# Patient Record
(demographics unavailable — no encounter records)

---

## 2024-10-28 NOTE — HISTORY OF PRESENT ILLNESS
[FreeTextEntry1] : EDDI GAMING is an 65 year old female who presents today for a 3-month weight check. Her current weight goal continues to be 240-250. Patient reports an additional 10 pound weight loss. She is now under 300 pounds. She expresses she is frustrated with the slow nature of her weight loss. Patient continues to complain of hip pain and is scheduled to start local steroid injections. She has agreed to get her CAT scan, as it is required for surgery.

## 2024-10-28 NOTE — PHYSICAL EXAM
[NI] : Normal [de-identified] : NAD, Axox3  [de-identified] : nonlabored breathing  [de-identified] : Very low hanging pannus  Extending to the mid thigh  Striae is present  No appreciable abdominal hernias  Lap sites well healed  [de-identified] : LE edema  [de-identified] : normal affect

## 2024-10-28 NOTE — PHYSICAL EXAM
[NI] : Normal [de-identified] : NAD, Axox3  [de-identified] : nonlabored breathing  [de-identified] : Very low hanging pannus  Extending to the mid thigh  Striae is present  No appreciable abdominal hernias  Lap sites well healed  [de-identified] : LE edema  [de-identified] : normal affect

## 2024-10-28 NOTE — END OF VISIT
[FreeTextEntry3] : All medical record entries made by the Scribe were at my, Dr. Lauren Shikowitz-Behr, MD, direction and personally dictated by me on 10/25/2024. I have reviewed the chart and agree that the record accurately reflects my personal performance of the history, physical exam, assessment and plan. I have also personally directed, reviewed, and agreed with the chart.

## 2024-10-28 NOTE — ADDENDUM
[FreeTextEntry1] :  I, Kolton Loredo, documented this note as a scribe on behalf of Dr. Lauren Shikowitz-Behr, MD on 10/25/2024.

## 2024-10-29 NOTE — ASSESSMENT
[FreeTextEntry1] : is scheduled for abdominal surgery in January 2025; wants to try the gel shots both knees and left shoulder, as it wakes her up. Will continue with pool exercises.

## 2024-10-29 NOTE — PHYSICAL EXAM
[Bilateral] : knee bilaterally [Left] : left shoulder [Sitting] : sitting [Moderate] : moderate [5___] : internal rotation 5[unfilled]/5 [] : no sensory deficits [TWNoteComboBox6] : internal rotation L1 [de-identified] : external rotation 50 degrees [de-identified] : severe PVD and swelling both legs [TWNoteComboBox7] : flexion 100 degrees [de-identified] : extension 25 degrees

## 2024-10-29 NOTE — HISTORY OF PRESENT ILLNESS
[Gradual] : gradual [4] : 4 [0] : 0 [Dull/Aching] : dull/aching [Sharp] : sharp [Shooting] : shooting [Intermittent] : intermittent [Household chores] : household chores [Leisure] : leisure [Rest] : rest [Walking] : walking [Retired] : Work status: retired [] : Post Surgical Visit: no [FreeTextEntry1] : [facility] knees [de-identified] : 7/22/24 [de-identified] : Dr. Xavier

## 2024-10-29 NOTE — REASON FOR VISIT
[FreeTextEntry2] : Patient c/o bilateral knee pain, but left hip and left shoulder are more painful. Continues to do home exercises.

## 2024-11-05 NOTE — PHYSICAL EXAM
[Left] : left shoulder [Sitting] : sitting [Moderate] : moderate [5___] : internal rotation 5[unfilled]/5 [Bilateral] : knee bilaterally [] : no sensory deficits [TWNoteComboBox6] : internal rotation L1 [de-identified] : external rotation 50 degrees [de-identified] : severe PVD and swelling both legs [TWNoteComboBox7] : flexion 100 degrees [de-identified] : extension 25 degrees

## 2024-11-05 NOTE — HISTORY OF PRESENT ILLNESS
[Gradual] : gradual [4] : 4 [0] : 0 [Dull/Aching] : dull/aching [Sharp] : sharp [Shooting] : shooting [Intermittent] : intermittent [Household chores] : household chores [Leisure] : leisure [Rest] : rest [Walking] : walking [Retired] : Work status: retired [1] : 1 [] : Post Surgical Visit: no [FreeTextEntry1] : [facility] knees [de-identified] : 7/22/24 [de-identified] : Dr. Xavier [de-identified] : B Knees L Shoulder

## 2024-11-05 NOTE — PROCEDURE
[Bilateral] : bilaterally of the [Knee] : knee [Large Joint Injection] : Large joint injection [Left] : of the left [Glenohumeral Joint] : glenohumeral joint [Pain] : pain [X-ray evidence of Osteoarthritis on this or prior visit] : x-ray evidence of Osteoarthritis on this or prior visit [Betadine] : betadine [Ethyl Chloride sprayed topically] : ethyl chloride sprayed topically [Sterile technique used] : sterile technique used [Gel-Syn (16.8mg)] : 16.8mg of Gel-Syn [#1] : series #1 [] : Patient tolerated procedure well [Apply ice for 15min out of every hour for the next 12-24 hours as tolerated] : apply ice for 15 minutes out of every hour for the next 12-24 hours as tolerated [Patient was advised to rest the joint(s) for ____ days] : patient was advised to rest the joint(s) for [unfilled] days [Risks, benefits, alternatives discussed / Verbal consent obtained] : the risks benefits, and alternatives have been discussed, and verbal consent was obtained [Prior failure or difficult injection] : prior failure or difficult injection [All ultrasound images have been permanently captured and stored accordingly in our picture archiving and communication system] : All ultrasound images have been permanently captured and stored accordingly in our picture archiving and communication system [Visualization of the needle and placement of injection was performed without complication] : visualization of the needle and placement of injection was performed without complication

## 2024-11-12 NOTE — HISTORY OF PRESENT ILLNESS
[Gradual] : gradual [4] : 4 [0] : 0 [Dull/Aching] : dull/aching [Sharp] : sharp [Shooting] : shooting [Intermittent] : intermittent [Household chores] : household chores [Leisure] : leisure [Rest] : rest [Walking] : walking [Retired] : Work status: retired [2] : 2 [] : Post Surgical Visit: no [FreeTextEntry1] : [facility] knees [de-identified] : 7/22/24 [de-identified] : Dr. Xavier [de-identified] : 11/5/2024 [de-identified] : B Knees L Shoulder

## 2024-11-12 NOTE — PROCEDURE
[Bilateral] : bilaterally of the [Knee] : knee [Large Joint Injection] : Large joint injection [Left] : of the left [Glenohumeral Joint] : glenohumeral joint [Pain] : pain [X-ray evidence of Osteoarthritis on this or prior visit] : x-ray evidence of Osteoarthritis on this or prior visit [Betadine] : betadine [Ethyl Chloride sprayed topically] : ethyl chloride sprayed topically [Sterile technique used] : sterile technique used [Gel-Syn (16.8mg)] : 16.8mg of Gel-Syn [] : Patient tolerated procedure well [Apply ice for 15min out of every hour for the next 12-24 hours as tolerated] : apply ice for 15 minutes out of every hour for the next 12-24 hours as tolerated [Patient was advised to rest the joint(s) for ____ days] : patient was advised to rest the joint(s) for [unfilled] days [Risks, benefits, alternatives discussed / Verbal consent obtained] : the risks benefits, and alternatives have been discussed, and verbal consent was obtained [Prior failure or difficult injection] : prior failure or difficult injection [All ultrasound images have been permanently captured and stored accordingly in our picture archiving and communication system] : All ultrasound images have been permanently captured and stored accordingly in our picture archiving and communication system [Visualization of the needle and placement of injection was performed without complication] : visualization of the needle and placement of injection was performed without complication [#2] : series #2

## 2024-11-12 NOTE — PHYSICAL EXAM
[Left] : left shoulder [Sitting] : sitting [Moderate] : moderate [5___] : internal rotation 5[unfilled]/5 [Bilateral] : knee bilaterally [] : no sensory deficits [TWNoteComboBox6] : internal rotation L1 [de-identified] : external rotation 50 degrees [de-identified] : severe PVD and swelling both legs [TWNoteComboBox7] : flexion 100 degrees [de-identified] : extension 25 degrees

## 2024-11-19 NOTE — ASSESSMENT
[FreeTextEntry1] : recommend rest and apply ice to the knees and left shoulder today wants a Rx for PT

## 2024-11-19 NOTE — ASSESSMENT
[FreeTextEntry1] : 65-year-old woman s/p Laparoscopic Gastric Sleeve Gastrectomy with hiatal hernia repair presents for follow-up. 12 lb weight loss since last visit over 2 months ago; on Ozempic 2mg and Topiramate 100mg/PM (by OM). Nutrition and exercise guidelines reviewed with the patient.   Encourage 3 protein-focused meals/day (aim for 70 g protein/day); consider [place] yogurt for breakfast  Avoid skipping meals (i.e. frontload calories earlier in the day) Increase zero calorie fluid intake (aim for 64 oz/day) Continue bariatric vitamins Continue PT Weigh yourself 1x-2x/week Try the Calm dustin or Soothing Pod dustin for stress management Follow-up with nutritionist (keep a food log) Follow-up with OM Follow-up in February, 4-6 weeks after panniculectomy All questions answered   Call with any questions or concerns   Time before and after visit spent reviewing chart

## 2024-11-19 NOTE — PHYSICAL EXAM
[Obese, well nourished, in no acute distress] : obese, well nourished, in no acute distress [Normal] : affect appropriate [de-identified] : Equal chest rise, non-labored respirations, no audible wheezing. [de-identified] : soft, NT, ND, no evidence of hernia; diastasis observed, incisions well-healed; abdominal pannus [de-identified] : difficulty ambulating, uses a walker

## 2024-11-19 NOTE — PHYSICAL EXAM
[Obese, well nourished, in no acute distress] : obese, well nourished, in no acute distress [Normal] : affect appropriate [de-identified] : Equal chest rise, non-labored respirations, no audible wheezing. [de-identified] : soft, NT, ND, no evidence of hernia; diastasis observed, incisions well-healed; abdominal pannus [de-identified] : difficulty ambulating, uses a walker

## 2024-11-19 NOTE — PHYSICAL EXAM
[Left] : left shoulder [Sitting] : sitting [Moderate] : moderate [5___] : internal rotation 5[unfilled]/5 [Bilateral] : knee bilaterally [] : no sensory deficits [TWNoteComboBox6] : internal rotation L1 [de-identified] : external rotation 50 degrees [de-identified] : severe PVD and swelling both legs [TWNoteComboBox7] : flexion 100 degrees [de-identified] : extension 25 degrees

## 2024-11-19 NOTE — ADDENDUM
[FreeTextEntry1] : Note entered by Shahida Plascencia, acting as a scribe.
[FreeTextEntry1] : Note entered by Shahida Plascencia, acting as a scribe.
Yes

## 2024-11-19 NOTE — HISTORY OF PRESENT ILLNESS
[Gradual] : gradual [4] : 4 [0] : 0 [Dull/Aching] : dull/aching [Sharp] : sharp [Shooting] : shooting [Intermittent] : intermittent [Household chores] : household chores [Leisure] : leisure [Rest] : rest [Walking] : walking [Retired] : Work status: retired [3] : 3 [] : Post Surgical Visit: no [FreeTextEntry1] : [facility] knees [de-identified] : 11/12/24 [de-identified] : Dr. Xavier [de-identified] : 11/12/24 [de-identified] : B Knees L Shoulder

## 2024-11-19 NOTE — HISTORY OF PRESENT ILLNESS
[Procedure: ___] : Procedure performed: [unfilled]  [Date of Surgery: ___] : Date of Surgery:   [unfilled] [Surgeon Name:   ___] : Surgeon Name: Dr. AGUILAR [Pre-Op Weight ___] : Pre-op weight was [unfilled] lbs [de-identified] : 65-year-old woman s/p Laparoscopic Gastric Sleeve Gastrectomy with hiatal hernia repair presents for follow-up. 12 lb weight loss since last visit over 2 months ago; on Ozempic 2mg and Topiramate 100mg/PM (by OM). Reports constipation (attributes to opioid pain medication); reports no abdominal pain, nausea/vomiting, diarrhea, reflux/heartburn. Patient eating 2-3 protein-rich meals/day (sometimes skips breakfast), is trying to drink adequate zero-calorie fluids/day, taking bariatric vitamins, and doing aqua-aerobics for exercise. Pt has panniculectomy scheduled for 1/8/25.   Next nutritionist appointment is 11/22/24 Next Obesity Medicine appointment is 12/2/24

## 2024-11-19 NOTE — ASSESSMENT
[FreeTextEntry1] : 65-year-old woman s/p Laparoscopic Gastric Sleeve Gastrectomy with hiatal hernia repair presents for follow-up. 12 lb weight loss since last visit over 2 months ago; on Ozempic 2mg and Topiramate 100mg/PM (by OM). Nutrition and exercise guidelines reviewed with the patient.   Encourage 3 protein-focused meals/day (aim for 70 g protein/day); consider Thai yogurt for breakfast  Avoid skipping meals (i.e. frontload calories earlier in the day) Increase zero calorie fluid intake (aim for 64 oz/day) Continue bariatric vitamins Continue PT Weigh yourself 1x-2x/week Try the Calm dustin or Soothing Pod dustin for stress management Follow-up with nutritionist (keep a food log) Follow-up with OM Follow-up in February, 4-6 weeks after panniculectomy All questions answered   Call with any questions or concerns   Time before and after visit spent reviewing chart

## 2024-11-19 NOTE — HISTORY OF PRESENT ILLNESS
[Procedure: ___] : Procedure performed: [unfilled]  [Date of Surgery: ___] : Date of Surgery:   [unfilled] [Surgeon Name:   ___] : Surgeon Name: Dr. AGUILAR [Pre-Op Weight ___] : Pre-op weight was [unfilled] lbs [de-identified] : 65-year-old woman s/p Laparoscopic Gastric Sleeve Gastrectomy with hiatal hernia repair presents for follow-up. 12 lb weight loss since last visit over 2 months ago; on Ozempic 2mg and Topiramate 100mg/PM (by OM). Reports constipation (attributes to opioid pain medication); reports no abdominal pain, nausea/vomiting, diarrhea, reflux/heartburn. Patient eating 2-3 protein-rich meals/day (sometimes skips breakfast), is trying to drink adequate zero-calorie fluids/day, taking bariatric vitamins, and doing aqua-aerobics for exercise. Pt has panniculectomy scheduled for 1/8/25.   Next nutritionist appointment is 11/22/24 Next Obesity Medicine appointment is 12/2/24

## 2024-11-19 NOTE — PROCEDURE
[Bilateral] : bilaterally of the [Knee] : knee [Large Joint Injection] : Large joint injection [Left] : of the left [Glenohumeral Joint] : glenohumeral joint [Pain] : pain [X-ray evidence of Osteoarthritis on this or prior visit] : x-ray evidence of Osteoarthritis on this or prior visit [Betadine] : betadine [Ethyl Chloride sprayed topically] : ethyl chloride sprayed topically [Sterile technique used] : sterile technique used [Gel-Syn (16.8mg)] : 16.8mg of Gel-Syn [] : Patient tolerated procedure well [Apply ice for 15min out of every hour for the next 12-24 hours as tolerated] : apply ice for 15 minutes out of every hour for the next 12-24 hours as tolerated [Patient was advised to rest the joint(s) for ____ days] : patient was advised to rest the joint(s) for [unfilled] days [Risks, benefits, alternatives discussed / Verbal consent obtained] : the risks benefits, and alternatives have been discussed, and verbal consent was obtained [Prior failure or difficult injection] : prior failure or difficult injection [All ultrasound images have been permanently captured and stored accordingly in our picture archiving and communication system] : All ultrasound images have been permanently captured and stored accordingly in our picture archiving and communication system [Visualization of the needle and placement of injection was performed without complication] : visualization of the needle and placement of injection was performed without complication [#3] : series #3

## 2024-12-05 NOTE — ASSESSMENT
[FreeTextEntry1] : BARIATRIC SURGERY HISTORY: Laparoscopic Gastric Sleeve Gastrectomy with hiatal hernia repair , Date of Surgery: 9/26/2022  OBESITY CO-MORBIDITIES: DM, MANDY  DM resolved- now pre-dm  ANTI-OBESITY MEDICATIONS: semaglutide, topiramate   OBESITY MEDICATION SIDE EFFECTS: none    Plan:   f/u with bariatric team prepare food in advance, lean animal protein and fiber 3 meals a day last dose of ozempic on jan 13 for surgery  will decrease topiramate to 50mg and advised to be off topiramate 1 week prior to surgery. can restart topiramate at 50mg when patient is recovered from anesthesia  continue physical activity as tolerated- aquatherapy  cpap nightly  f/u 6-8 weeks

## 2024-12-05 NOTE — HISTORY OF PRESENT ILLNESS
[FreeTextEntry1] : This is a 64 year old female  being seen obesity followup visit.   Weight is stable  continues ozempic 2mg and topiramate 100mg, tolerating well. Denies side effects   patient prioritizing adequate protein intake and water  drinks protein shake daily  working with RD   in pool 3x/week for 2 hours- will do PT exercises at home   Patient had CT of abdomen to rule out hernia- noted gallstones- asymptomatic   appt scheduled for january 8 for panniculectomy- in hopes to get clearance for hip surgery- ortho needs better access to hip   Patient  requires hip surgery, goal BMI 40-45    was just diagnosed with non-hogkins lymphoma- who also has bladder cancer

## 2024-12-13 NOTE — REVIEW OF SYSTEMS
Ibuprofen (advil) 400-600 mg every 6 hours as needed for pain.  Follow up with your doctors at [facility].  Return for new or worsening symptoms, leg swelling, chest pain, shortness of breath, or palpitations.   [As Noted in HPI] : as noted in HPI [Negative] : Heme/Lymph

## 2024-12-16 NOTE — PHYSICAL EXAM
[NI] : Normal [de-identified] : NAD, Axox3  [de-identified] : nonlabored breathing  [de-identified] : Very low hanging pannus  Extending to the mid thigh  Striae is present  No appreciable abdominal hernias  Lap sites well healed  [de-identified] : LE edema  [de-identified] : normal affect

## 2024-12-16 NOTE — PHYSICAL EXAM
[NI] : Normal [de-identified] : NAD, Axox3  [de-identified] : nonlabored breathing  [de-identified] : Very low hanging pannus  Extending to the mid thigh  Striae is present  No appreciable abdominal hernias  Lap sites well healed  [de-identified] : LE edema  [de-identified] : normal affect

## 2024-12-16 NOTE — HISTORY OF PRESENT ILLNESS
[FreeTextEntry1] : EDDI GAMING is an 65 year old female who presents today for pre-operative discussion. She is scheduled for a Panniculectomy/Umbilectomy on 1/8/25. Patient with no new complaints today.  CT abdomen/pelvis revealed no hernias

## 2024-12-16 NOTE — END OF VISIT
[FreeTextEntry3] : All medical record entries made by the Scribe were at my, Dr. Lauren Shikowitz-Behr, MD, direction and personally dictated by me on 12/13/2024. I have reviewed the chart and agree that the record accurately reflects my personal performance of the history, physical exam, assessment and plan. I have also personally directed, reviewed, and agreed with the chart.

## 2024-12-16 NOTE — ADDENDUM
[FreeTextEntry1] :  I, Kolton Loredo, documented this note as a scribe on behalf of Dr. Lauren Shikowitz-Behr, MD on 12/13/2024.

## 2024-12-23 NOTE — DISCUSSION/SUMMARY
[EKG obtained to assist in diagnosis and management of assessed problem(s)] : EKG obtained to assist in diagnosis and management of assessed problem(s) [FreeTextEntry1] : Ms. Oconnor had a chest CT scan performed on 11/24/18 which was interpreted as revealing mild calcific coronary atherosclerosis as well as a dilated main pulmonary artery, suggestive of pulmonary hypertension. She reports having been diagnosed as having bilateral lower extremity lymphedema, for which she had previously been treated at a lymphedema clinic, and has been managed with compression stockings. She has been discovered as having severe obstructive sleep apnea, for which she is being treated with CPAP therapy.   The patient has been stable from a cardiac symptomatic standpoint since her previous visit with me on 8/19/2024.  Her cardiac examination today is unchanged from her previous visit with me, again remarkable for diminished heart tones, in all likelihood related to her obesity.  She has generalized diminished breath sounds on lung examination without rales, wheezing, or a prolonged expiratory phase. She has marked non-pitting bilateral lower extremity edema, unchanged from her previous visit with me.  Her blood pressure reading today is normal.  Her electrocardiogram today reveals sinus rhythm with mild first-degree AV block, nonspecific generalized diminished voltage, and nonspecific poor R wave progression, essentially unchanged from her previous office tracing, allowing for lead placement variation.  As her blood pressure reading today is normal, I have instructed the patient to continue lisinopril 20 mg daily for the time being.  I have reviewed the findings of the pharmacological nuclear stress study of 6/1/2022, the echocardiogram of 9/5/2024, the carotid artery Doppler study of 8/11/2023, and the 1 week extended Holter monitor study of 8/25/2023 in detail with the patient today.  I am referring the patient for follow-up echocardiography at this point to assess cardiac structural integrity, left ventricular function, valvular morphology and function, and the pulmonary artery systolic pressure.  The patient is going to make arrangements to have this study performed through our office, and I will telephone her to discuss the findings, once the study has been completed.  I have reviewed the findings of the blood test report of 11/12/2024 in detail with the patient today, and I have instructed her to continue Pravachol 40 mg daily for the time being.  The importance of dietary modification and weight loss was again emphasized to the patient today.  The patient is going to be following up with her pulmonologist regarding management of obstructive sleep apnea.  I find no absolute cardiac contraindication to the patient undergoing the proposed panniculectomy procedure as scheduled, under routine monitoring conditions.  I have asked the patient to call me if she should have any questions or problems pertaining to these matters, and especially if she should experience any concerning symptoms.  I have otherwise asked her to return to the office for follow-up cardiac evaluation in 6 months, provided she remains clinically stable in the interim.  Follow-up echocardiography will be planned for September 2025 for reassessment of the diameter of the ascending aorta.

## 2024-12-23 NOTE — CARDIOLOGY SUMMARY
[de-identified] : 12/23/24 -sinus rhythm at a rate of 86 bpm.  Nonspecific generalized diminished voltage.  First-degree AV block.  Nonspecific poor R wave progression.

## 2024-12-23 NOTE — CARDIOLOGY SUMMARY
[de-identified] : 12/23/24 -sinus rhythm at a rate of 86 bpm.  Nonspecific generalized diminished voltage.  First-degree AV block.  Nonspecific poor R wave progression.

## 2024-12-23 NOTE — HISTORY OF PRESENT ILLNESS
[FreeTextEntry1] : Ms. Nu Oconnor presented to the office today for follow-up cardiac evaluation.  I evaluated the patient in initial cardiology consultation on 18, noting that she was advised by her pulmonologist, Dr. Sams, to consult with a cardiologist regarding pulmonary hypertension and coronary artery calcifications noted on a CT scan of the chest.  I last evaluated the patient in the office on 2024.  The patient is a 65-year-old female with a history of hypertension, a dyslipidemia, diabetes, coronary artery calcifications, pulmonary hypertension, obstructive sleep apnea (CPAP), chronic lower extremity edema, breast carcinoma (status post right mastectomy ), colon polyps (3 tubular adenomas resected on 18), anxiety/depression, a suspected left adrenal nodule (reported on a chest CT scan of 18), osteoarthritis (knees and hips bilaterally), and a laparoscopic sleeve gastrectomy procedure with simultaneous hiatal hernia repair (2022).  The patient has not noted dyspnea on exertion since her previous visit here on 2024, noting that she ambulates with a walker and exercises in a swimming pool 3 times weekly. She sleeps in a recliner chair with a CPAP device, without experiencing orthopnea or paroxysmal nocturnal dyspnea. She has not noted any appreciable change in her usual degree of lower extremity swelling, which has been attributed to bilateral lymphedema, for which she wears compression stockings, and had previously been following in a lymphedema clinic.  She has not experienced any symptoms of chest discomfort which have been precipitated or exacerbated by physical activity.  She has not experienced any episodes of palpitations, presyncope or syncope.  The patient reports having been evaluated by a vascular specialist (Dr. Pinto), who attributed her lower extremity swelling to lymphedema and referred her to a lymphedema specialist.  The patient consulted with a lymphedema specialist, who the patient states did not feel that she had "true lymphedema", noting that she appears to respond to wearing compression stockings.  The patient anticipates undergoing panniculectomy on 2025, in hopes of allowing her orthopedist better access for potential hip surgery in the future.  She is currently being managed with Ozempic and topiramate in an effort to lose weight (she is status post laparoscopic sleeve gastrectomy 2022).  The Ozempic is presently on hold in anticipation of the upcoming surgery.  The patient telephoned me on 2023 after having been told of exhibiting "an irregular heartbeat" during a transcranial Doppler study performed on 2023 for further evaluation of a left-sided visual disturbance (ocular migraine?).  The transcranial Doppler study was interpreted as being unrevealing.  A follow-up 1 week extended Holter monitor study initiated on 2023 revealed the predominant rhythm to be sinus rhythm at an average rate of 67 bpm with rare supraventricular premature contractions, rare ventricular premature contractions, and an isolated 6 beat episode of wide-complex tachycardia at an average rate of 136 bpm.  As far as risk factors for coronary artery disease are concerned, the patient has a history of hypertension and a dyslipidemia. She describes having exhibited evidence for pre-diabetes. She discontinued cigarette smoking in , having previously smoked one pack per day for a period of 10 years. She does not have a known immediate family history of premature coronary artery disease.  Laboratory studies performed on 2024 (on Pravachol 40 mg daily) revealed cholesterol 164, triglycerides 112, HDL 45, and calculated LDL 99.  The liver chemistries were normal.  The BUN and creatinine were 28 and 1.54, respectively.  The potassium level was 4.6.  The glucose level was 92 and the hemoglobin A1c level was 5.1%.  The hemoglobin and hematocrit were 11.7 and 35.9, respectively.  The vitamin D level was 35.9.  Carotid artery Doppler testing performed on 2023 was interpreted as revealing scattered plaque without any stenoses demonstrated.  Echocardiography most recently performed on 2024 revealed normal cardiac chamber sizes with normal left ventricular wall thickness.  Left ventricular systolic function was normal, with a calculated ejection fraction of 62%.  Impaired diastolic relaxation of the left ventricle was demonstrated.  The ascending aorta was noted to be mildly dilated, measuring 4.0 cm in diameter.  No significant valvular abnormalities were demonstrated.  There was no evidence of pulmonary hypertension.  Pharmacological nuclear stress testing performed on 2022 was negative for the inducement of cardiac symptoms, electrocardiographic evidence of myocardial ischemia, or significant arrhythmias.  The cardiac imaging portion of the study revealed normal left ventricular myocardial perfusion and systolic function, with an estimated ejection fraction of 65%.  Previous History:  The patient had noted dyspnea on exertion over a period of several months, for which she sought attention at an urgent care facility on 11/15/18. She was diagnosed as having bronchitis, for which prednisone, doxycycline, and albuterol nebulizer therapy were prescribed, with gradual improvement in her symptoms. She then consulted with Dr. Sams on 18, who recommended continuing this therapy. The patient was referred for a CT scan of the chest, which was performed on 18, and was interpreted as revealing a 1.5 cm groundglass opacity involving the right middle lobe of the lung, a dilated main pulmonary artery, mild calcific coronary atherosclerosis, and a suspected 1.6 cm left adrenal nodule. The patient followed up with Dr. Sams on 18, who recommended cardiology consultation with echocardiography for further evaluation of the dilated main pulmonary artery described on the chest CT scan, a home sleep study to rule out obstructive sleep apnea, and an abdominal MRI scan for further evaluation of the left adrenal nodule described on the chest CT scan (she reports having presented for the MRI scan, however, the study could not be performed, secondary to the patient having a significant cough and being unable to lie still on the table).  Past medical/surgical history according to the patient is significant for the patient having had arthroscopic repair of a torn meniscus involving the left knee in . She underwent a MOHS procedure several years ago for treatment of a left paranasal squamous cell carcinoma. She has had 3 full-term uncomplicated pregnancies, the first of which was delivered via  section secondary to breech presentation, with the subsequent 2 pregnancies being delivered vaginally. She reports suffering from anxiety/depression, for which she is presently being treated with Prozac, as well as Xanax p.r.n.

## 2024-12-23 NOTE — PHYSICAL EXAM
[General Appearance - In No Acute Distress] : no acute distress [Normal Conjunctiva] : the conjunctiva exhibited no abnormalities [No Oral Pallor] : no oral pallor [] : no respiratory distress [Respiration, Rhythm And Depth] : normal respiratory rhythm and effort [Bowel Sounds] : normal bowel sounds [Abdomen Tenderness] : non-tender [Abnormal Walk] : normal gait [Oriented To Time, Place, And Person] : oriented to person, place, and time [Not Palpable] : not palpable [No Precordial Heave] : no precordial heave was noted [Normal Rate] : normal [Rhythm Regular] : regular [Normal S1] : normal S1 [Normal S2] : normal S2 [No Gallop] : no gallop heard [Distant] : the heart sounds were distant [No Murmur] : no murmurs heard [2+] : left 2+ [No Abnormalities] : the abdominal aorta was not enlarged and no bruit was heard [Nonpitting Edema] : nonpitting edema present [FreeTextEntry1] : chronic lower extremity venous stasis changes with chronic cellulitic changes [Apical Thrill] : no thrill palpable at the apex [Click] : no click [Pericardial Rub] : no pericardial rub [Right Carotid Bruit] : no bruit heard over the right carotid [Left Carotid Bruit] : no bruit heard over the left carotid

## 2025-02-07 NOTE — ADDENDUM
[FreeTextEntry1] :  I, Kolton Loredo, documented this note as a scribe on behalf of Dr. Lauren Shikowitz-Behr, MD on 02/04/2025.

## 2025-02-07 NOTE — PHYSICAL EXAM
[de-identified] : NAD, AxOx3  [de-identified] : nonlabored breathing  [de-identified] : lower abdominal incision clean, dry, and intact  expected swelling is present  flaps appear viable  prevena dressing in  place  drains light serosanguinous in output  no evidence of hematoma, infection, or skin breakdown [de-identified] : +edema  [de-identified] : as above [de-identified] : normal affect

## 2025-02-07 NOTE — HISTORY OF PRESENT ILLNESS
[FreeTextEntry1] : EDDI GAMING is an 65 year old female who presents today for first postoperative visit s/p Panniculectomy/Umbilectomy on 1/30/25. Patient reports of mild discomfort but denies significant pain. As per the patient and , drains do not meet criteria for removal.  She denies fevers, chills, LE edema, SOB or CP

## 2025-02-07 NOTE — PHYSICAL EXAM
[de-identified] : NAD, AxOx3  [de-identified] : nonlabored breathing  [de-identified] : lower abdominal incision clean, dry, and intact  expected swelling is present  flaps appear viable  prevena dressing in  place  drains light serosanguinous in output  no evidence of hematoma, infection, or skin breakdown [de-identified] : +edema  [de-identified] : as above [de-identified] : normal affect

## 2025-02-07 NOTE — END OF VISIT
[FreeTextEntry3] : All medical record entries made by the Scribe were at my, Dr. Lauren Shikowitz-Behr, MD, direction and personally dictated by me on 02/04/2025. I have reviewed the chart and agree that the record accurately reflects my personal performance of the history, physical exam, assessment and plan. I have also personally directed, reviewed, and agreed with the chart.

## 2025-02-07 NOTE — PHYSICAL EXAM
[de-identified] : NAD, AxOx3  [de-identified] : nonlabored breathing  [de-identified] : lower abdominal incision clean, dry, and intact  expected swelling is present  flaps appear viable  prevena dressing in  place  drains light serosanguinous in output  no evidence of hematoma, infection, or skin breakdown [de-identified] : +edema  [de-identified] : as above [de-identified] : normal affect

## 2025-02-10 NOTE — HISTORY OF PRESENT ILLNESS
[FreeTextEntry1] : 65 year old female who presents today for first postoperative visit s/p Panniculectomy/Umbilectomy on 1/30/25. Patient reports left abdominal drain slipped out last night, right SHELLEY drain with output over 100cc's in 24 hours. She denies fever, chills, redness, discharge.

## 2025-02-10 NOTE — PHYSICAL EXAM
[de-identified] : abdomen soft, nt. incisions c/d/i and healing well. no erythema/warmth/dc. right abdominal SHELLEY drain held to suction with SS output. dressing changed. left SHELLEY site suture removed, dressing placed.

## 2025-02-19 NOTE — HISTORY OF PRESENT ILLNESS
[FreeTextEntry1] : This is a 64 year old female with DM, MANDY with hx of Laparoscopic Gastric Sleeve Gastrectomy seen for obesity followup visit.   current weight: 280 overall weight loss: 130 lbs  underwent panniculectomy 3 weeks ago needs hip replacement surgery- ortho needs better access to hip   Post op- resumed ozempic 1mg and topiramate 75 mg tolerating well without side effects  will increase slowly back to doses prior to surgery  was previously on ozempic 2mg and topiramate 100 mg prior to surgery   still has drain  following up with plastic surgery weekly and visiting nurse   drinking 1 protein shake a day staying hydrated   Patient had CT of abdomen in Dec to rule out hernia- noted gallstones- asymptomatic - Advised by GI to continue ozempic - will see him in the future to discuss removing gallbladder

## 2025-02-19 NOTE — ASSESSMENT
[FreeTextEntry1] : BARIATRIC SURGERY HISTORY: Laparoscopic Gastric Sleeve Gastrectomy with hiatal hernia repair , Date of Surgery: 9/26/2022  OBESITY CO-MORBIDITIES: DM, MANDY  DM resolved- now pre-dm  ANTI-OBESITY MEDICATIONS: semaglutide, topiramate   OBESITY MEDICATION SIDE EFFECTS: none    Plan: f/u with surgeon weekly  f/u with bariatrics 3/4 f/u with GI focus on whole food, lean animal protein and fiber stay hydrated  3 distinguished meals a day continue with ozempic 1mg for 1 more week and then increase to ozempic 2mg  continue topiramate 75mg (has a few days left) and will increase back to topiramate 100mg  resume physical activity as tolerated continue cpap nightly discussed possible risks associated with glp1s and gallstones- pt verbalized understanding   f/u 6-8 weeks

## 2025-02-19 NOTE — HISTORY OF PRESENT ILLNESS
[FreeTextEntry1] : EDDI GAMING is an 65 year old female who presents today s/p Panniculectomy/Umbilectomy on 1/30/25. Patient's visiting nurse called the office today stating Poli drain is not holding suction. Visiting nurse reinforced drain, but patient was instructed to come to the office today for further evaluation of drain Patient states she is taking a diuretic for her LE edema and continues with oral abx as prescribed by our office As per patient, drain output still 70-80cc's over 24hrs.

## 2025-02-19 NOTE — PHYSICAL EXAM
[de-identified] : NAD, AxOx3  [de-identified] : nonolabored breathing  [de-identified] : lower abdominal incision clean with 0.5 m area central superficial wound separation, slightly larger mild fibrinous tissue present Right abdominal drain with approximately 3cm of tubing dislodged Surrounding macreration at drain insertion site No evidence of infection Drain is serous in output  [de-identified] : +LE edema  [de-identified] : as above [de-identified] : normal affect

## 2025-02-19 NOTE — END OF VISIT
[FreeTextEntry3] :  All medical record entries made by the Scribe were at my, PILAR Segovia, direction and personally dictated by me on  02/18/2025. I have reviewed the chart and agree that the record accurately reflects my personal performance of the history, physical exam, assessment and plan. I have also personally directed, reviewed, and agreed with the chart.

## 2025-02-19 NOTE — END OF VISIT
[FreeTextEntry3] : All medical record entries made by the Scribe were at my, Dr. Lauren Shikowitz-Behr, MD, direction and personally dictated by me on 02/14/2025. I have reviewed the chart and agree that the record accurately reflects my personal performance of the history, physical exam, assessment and plan. I have also personally directed, reviewed, and agreed with the chart.

## 2025-02-19 NOTE — PHYSICAL EXAM
[de-identified] : NAD, AxOx 3 [de-identified] : nonlabored breathing  [de-identified] : Lower abdominal incision clean and dry  There is central superficial wound separation  No evidence of infection  Drain is serous in output [de-identified] : +LE edema  [de-identified] : as above [de-identified] : normal affect

## 2025-02-19 NOTE — PHYSICAL EXAM
[de-identified] : NAD, AxOx 3 [de-identified] : nonlabored breathing  [de-identified] : Lower abdominal incision clean and dry  There is central superficial wound separation  No evidence of infection  Drain is serous in output [de-identified] : +LE edema  [de-identified] : as above [de-identified] : normal affect

## 2025-02-19 NOTE — ADDENDUM
[FreeTextEntry1] :  I, Kolton Loredo, documented this note as a scribe on behalf of PILAR Segovia on  02/18/2025.

## 2025-02-19 NOTE — PHYSICAL EXAM
[de-identified] : NAD, AxOx3  [de-identified] : nonolabored breathing  [de-identified] : lower abdominal incision clean with 0.5 m area central superficial wound separation, slightly larger mild fibrinous tissue present Right abdominal drain with approximately 3cm of tubing dislodged Surrounding macreration at drain insertion site No evidence of infection Drain is serous in output  [de-identified] : +LE edema  [de-identified] : as above [de-identified] : normal affect

## 2025-02-19 NOTE — HISTORY OF PRESENT ILLNESS
[FreeTextEntry1] : EDDI GAMING is an 65 year old female who presents today s/p Panniculectomy/Umbilectomy on 1/30/25. Patient admits that the left drain had fallen out several days ago. Patient does state that the right abdominal drain ranges from 70-80ccs over 24 hours. She otherwise has no other complaints.

## 2025-02-19 NOTE — ADDENDUM
[FreeTextEntry1] :  I, Kolton Loredo, documented this note as a scribe on behalf of Dr. Lauren Shikowitz-Behr, MD on 02/14/2025.

## 2025-03-03 NOTE — HISTORY OF PRESENT ILLNESS
[FreeTextEntry1] : Sonam Oconnor is a 64 y/o female s/p Panniculectomy./Umbilectomy on 1/30/25. Patient had drain re-sutured in place, but states she felt it get caught on the arm of her chair. She now states drain is not holding suction. She otherwise has no other complaints

## 2025-03-03 NOTE — PHYSICAL EXAM
[de-identified] : NAD, AxOX3  [de-identified] : nonlabored breathing  [de-identified] : incision, clean and dry central incision with superficial wound separation, healing, neva and granulating DRain serous in output Drain black dot approx 2-3 cm dislodged No evidence of infection [de-identified] : +edema  [de-identified] : as above [de-identified] : normal affect

## 2025-03-03 NOTE — PHYSICAL EXAM
[de-identified] : NAD, AxOX3  [de-identified] : nonlabored breathing  [de-identified] : incision, clean and dry central incision with superficial wound separation, healing, neva and granulating DRain serous in output Drain black dot approx 2-3 cm dislodged No evidence of infection [de-identified] : +edema  [de-identified] : as above [de-identified] : normal affect

## 2025-03-04 NOTE — PHYSICAL EXAM
[de-identified] : NAD, AxOx3  [de-identified] : nonlabored breathing  [de-identified] : Lower abdominal incision clean  With stable central superficial wound separation  Granulation tissue is present  Right abdominal drain remains in place with serous output  [de-identified] : no edema  [de-identified] : as above [de-identified] : normal affect

## 2025-03-04 NOTE — PHYSICAL EXAM
[de-identified] : NAD, AxOx3  [de-identified] : nonlabored breathing  [de-identified] : Lower abdominal incision clean  With stable central superficial wound separation  Granulation tissue is present  Right abdominal drain remains in place with serous output  [de-identified] : no edema  [de-identified] : as above [de-identified] : normal affect

## 2025-03-04 NOTE — END OF VISIT
[FreeTextEntry3] : All medical record entries made by the Scribe were at my, Dr. Lauren Shikowitz-Behr, MD, direction and personally dictated by me on 02/28/2025. I have reviewed the chart and agree that the record accurately reflects my personal performance of the history, physical exam, assessment and plan. I have also personally directed, reviewed, and agreed with the chart. None

## 2025-03-04 NOTE — ADDENDUM
[FreeTextEntry1] :  I, Kolton Loredo, documented this note as a scribe on behalf of Dr. Lauren Shikowitz-Behr, MD on 02/28/2025.

## 2025-03-04 NOTE — HISTORY OF PRESENT ILLNESS
[FreeTextEntry1] : EDDI GAMING is an 65 year old female who presents today s/p Panniculectomy/Umbilectomy on 1/30/25. Patient states drain still continues with output of 50ccs over 24 hours but is decreasing. Patient continues with dry dressing to central abdominal incision. She has no other complaints today
[FreeTextEntry1] : EDDI GAMING is an 65 year old female who presents today s/p Panniculectomy/Umbilectomy on 1/30/25. Patient states drain still continues with output of 50ccs over 24 hours but is decreasing. Patient continues with dry dressing to central abdominal incision. She has no other complaints today
Follow up with PMD

## 2025-03-10 NOTE — PHYSICAL EXAM
[de-identified] : NAD, AxOx3  [de-identified] : nonlabored breathing [de-identified] : Lower abdominal incision clean and dry  Central wound separation is closing and becoming more superficial  Granulating well No evidence of infection  Drain remains in place with serous output [de-identified] : no edema [de-identified] : as above  [de-identified] : normal affect

## 2025-03-10 NOTE — HISTORY OF PRESENT ILLNESS
[FreeTextEntry1] : EDDI GAMING is an 65 year old female who presents today s/p Panniculectomy/Umbilectomy on 1/30/25. As per patient and , drain still does not meet criteria for removal. Drain output has been in the 40s. She otherwise has no other complaints today

## 2025-03-10 NOTE — END OF VISIT
[FreeTextEntry3] : All medical record entries made by the Scribe were at my, Dr. Lauren Shikowitz-Behr, MD, direction and personally dictated by me on 03/07/2025. I have reviewed the chart and agree that the record accurately reflects my personal performance of the history, physical exam, assessment and plan. I have also personally directed, reviewed, and agreed with the chart.

## 2025-03-10 NOTE — PHYSICAL EXAM
[de-identified] : NAD, AxOx3  [de-identified] : nonlabored breathing [de-identified] : Lower abdominal incision clean and dry  Central wound separation is closing and becoming more superficial  Granulating well No evidence of infection  Drain remains in place with serous output [de-identified] : no edema [de-identified] : as above  [de-identified] : normal affect

## 2025-03-10 NOTE — ADDENDUM
[FreeTextEntry1] :  I, Kolton Loredo, documented this note as a scribe on behalf of Dr. Lauren Shikowitz-Behr, MD on 03/07/2025.

## 2025-03-14 NOTE — PHYSICAL EXAM
[de-identified] : NAD, AxOx3  [de-identified] : nonlabored breathing  [de-identified] : Lower abdominal incision healing well  Central wound separation closing and neva  Drain site with mild maceration but clean, without infection [de-identified] : no edema  [de-identified] : as above [de-identified] : normal affect

## 2025-03-14 NOTE — ADDENDUM
[FreeTextEntry1] :  I, Kolton Loredo, documented this note as a scribe on behalf of Dr. Lauren Shikowitz-Behr, MD on 03/14/2025.

## 2025-03-14 NOTE — HISTORY OF PRESENT ILLNESS
[FreeTextEntry1] : EDDI GAMING is an 65 year old female who presents today s/p Panniculectomy/Umbilectomy on 1/30/25. Patient states that drain fell out last night, she does admit to output being 35ccs, 35ccs the 27ccs each over a 24 hour period. She otherwise has no other complaints.

## 2025-03-14 NOTE — END OF VISIT
[FreeTextEntry3] : All medical record entries made by the Scribe were at my, Dr. Lauren Shikowitz-Behr, MD, direction and personally dictated by me on 03/14/2025. I have reviewed the chart and agree that the record accurately reflects my personal performance of the history, physical exam, assessment and plan. I have also personally directed, reviewed, and agreed with the chart.

## 2025-03-25 NOTE — PHYSICAL EXAM
[Left] : left hip [Bilateral] : knee bilaterally [] : no pain with log-rolling of hip [TWNoteComboBox7] : flexion 100 degrees [de-identified] : severe PVD and swelling both legs [de-identified] : extension 25 degrees

## 2025-03-25 NOTE — HISTORY OF PRESENT ILLNESS
[Gradual] : gradual [4] : 4 [0] : 0 [Dull/Aching] : dull/aching [Sharp] : sharp [Shooting] : shooting [Intermittent] : intermittent [Household chores] : household chores [Leisure] : leisure [Rest] : rest [Walking] : walking [Retired] : Work status: retired [] : Post Surgical Visit: no [FreeTextEntry1] : [facility] knees [de-identified] : 11/19/2024 [de-identified] : Dr. Xavier [de-identified] : 11/19/2024

## 2025-03-25 NOTE — REASON FOR VISIT
[FreeTextEntry2] : Patient complains of continued B Knee pain. She would like cortisone injections today

## 2025-03-25 NOTE — ASSESSMENT
[FreeTextEntry1] : cortisone injections both knees; possible risks discussed including infection. Instructed on ice today. Requested prescriptions for PT and wheelchair.

## 2025-04-01 NOTE — PROCEDURE
[Large Joint Injection] : Large joint injection [Left] : of the left [Subacromial Space] : subacromial space [Pain] : pain [X-ray evidence of Osteoarthritis on this or prior visit] : x-ray evidence of Osteoarthritis on this or prior visit [Betadine] : betadine [Ethyl Chloride sprayed topically] : ethyl chloride sprayed topically [Sterile technique used] : sterile technique used [___ cc    1%] : Lidocaine ~Vcc of 1%  [___ cc    40mg] : Methylprednisolone (Depomedrol) ~Vcc of 40 mg  [] : Patient tolerated procedure well [Apply ice for 15min out of every hour for the next 12-24 hours as tolerated] : apply ice for 15 minutes out of every hour for the next 12-24 hours as tolerated [Patient was advised to rest the joint(s) for ____ days] : patient was advised to rest the joint(s) for [unfilled] days [Risks, benefits, alternatives discussed / Verbal consent obtained] : the risks benefits, and alternatives have been discussed, and verbal consent was obtained

## 2025-04-01 NOTE — ASSESSMENT
[FreeTextEntry1] : requested cortisone injection left shoulder today, possible risks discussed including infection; wants to proceed. Instructed on ice today.

## 2025-04-01 NOTE — REASON FOR VISIT
[Spouse] : spouse [FreeTextEntry2] : Patient has increased pain left shoulder since her last visit. She previously had Gelsyn injections with improvement. She would like a cortisone injection today.

## 2025-04-01 NOTE — HISTORY OF PRESENT ILLNESS
[8] : 8 [Dull/Aching] : dull/aching [Household chores] : household chores [Leisure] : leisure [Sleep] : sleep [Meds] : meds [Ice] : ice [Heat] : heat [Stairs] : stairs [] : Post Surgical Visit: no [FreeTextEntry1] : left shoulder  [FreeTextEntry9] : Tylenol  [de-identified] : lifting arm  [de-identified] : 11/19/24 [de-identified] : Dr. Xavier [de-identified] : 11/19/24 [de-identified] : left shoulder

## 2025-04-01 NOTE — PHYSICAL EXAM
[Sitting] : sitting [Moderate] : moderate [5___] : internal rotation 5[unfilled]/5 [Left] : left shoulder [There are no fractures, subluxations or dislocations. No significant abnormalities are seen] : There are no fractures, subluxations or dislocations. No significant abnormalities are seen [Glenohumeral arthritis] : Glenohumeral arthritis [FreeTextEntry1] : severe GH arthritis with subchondral cysts HH and inferior spur [] : no sensory deficits [TWNoteComboBox7] : active forward flexion 160 degrees [de-identified] : active abduction 160 degrees [TWNoteComboBox6] : internal rotation L1 [de-identified] : external rotation 50 degrees

## 2025-04-07 NOTE — PHYSICAL EXAM
[de-identified] : NAD, AxOx3  [de-identified] : nonlabored breathing  [de-identified] : Lower abdominal incision clean and dry  Previous central wound separation is now almost closed  No evidence of infection  [de-identified] : edema [de-identified] : as above [de-identified] : normal affect

## 2025-04-07 NOTE — END OF VISIT
[FreeTextEntry3] : All medical record entries made by the Scribe were at my, Dr. Lauren Shikowitz-Behr, MD, direction and personally dictated by me on 04/04/2025. I have reviewed the chart and agree that the record accurately reflects my personal performance of the history, physical exam, assessment and plan. I have also personally directed, reviewed, and agreed with the chart.

## 2025-04-07 NOTE — ADDENDUM
[FreeTextEntry1] :  I, Kolton Loredo, documented this note as a scribe on behalf of Dr. Lauren Shikowitz-Behr, MD on 04/04/2025.

## 2025-04-07 NOTE — PHYSICAL EXAM
[de-identified] : NAD, AxOx3  [de-identified] : nonlabored breathing  [de-identified] : Lower abdominal incision clean and dry  Previous central wound separation is now almost closed  No evidence of infection  [de-identified] : edema [de-identified] : as above [de-identified] : normal affect

## 2025-04-07 NOTE — HISTORY OF PRESENT ILLNESS
[FreeTextEntry1] : EDDI GAMING is an 65 year old female who presents today s/p Panniculectomy/Umbilectomy on 1/30/25. Patient with no complaints. She is pending an orthopedic appointment for hip replacement.

## 2025-04-09 NOTE — ASSESSMENT
[FreeTextEntry1] : BARIATRIC SURGERY HISTORY: Laparoscopic Gastric Sleeve Gastrectomy with hiatal hernia repair , Date of Surgery: 9/26/2022  OBESITY CO-MORBIDITIES: DM, MANDY  DM resolved- now pre-dm  ANTI-OBESITY MEDICATIONS: semaglutide, topiramate   OBESITY MEDICATION SIDE EFFECTS: none    Plan: f/u with bariatrics 4/22 focus on whole food, lean animal protein, fiber, unrefined carbs stay hydrated  3 distinguished meals a day continue with topiramate 100mg  continue ozempic 2mg  resume physical activity as tolerated continue cpap nightly following up with new Ortho for hip 4/24 f/u 6-8 weeks

## 2025-04-09 NOTE — HISTORY OF PRESENT ILLNESS
[FreeTextEntry1] : This is a 64 year old female with DM, MANDY with hx of Laparoscopic Gastric Sleeve Gastrectomy seen for obesity followup visit.   current weight: 280 lbs  overall weight loss: 130 lbs  underwent panniculectomy 1/30 needs hip replacement surgery- ortho needs better access to hip   currently on ozempic 2mg and topiramate 100mg - tolerating well without side effects feels appetite is not suppressed as it once was   is now on zepbound - they are both very mindful now about food quality  3 meals per day  just was cleared to start exercising on 4/4. will resume swimming tomorrow  drain was removed 3 weeks ago  had CPE in march - discussed Nov blood work results  sleeping well with CPAP   Hx: Patient had CT of abdomen in Dec to rule out hernia- noted gallstones- asymptomatic - Advised by GI to continue ozempic - will see him in the future to discuss removing gallbladder

## 2025-04-24 NOTE — DISCUSSION/SUMMARY
[de-identified] : I discussed nonoperative treatment options for their severe bilateral hip arthritis, bilateral knee arthritis. We discussed nonoperative treatments options including activity modification, therapy, bracing, nonsteroidal anti-inflammatory medications, and injections. The patient would like to continue with nonoperative treatment and would like to proceed with activity modification. I would like to see her back in one month  However, I discussed that their morbid obesity put them at a significant increased risk of infection, wound healing issue, early implant failure. I discussed that morbid obesity is a modifiable risk factor and that they must lose weight to decrease their risk of complications after total joint arthroplasty. I would like them to get to a BMI under 45. Target weight is 265   I discussed with them that I often prescribe an anti-inflammatory that should be taken once a day with meals to decrease pain and expedite symptom relief. They should not take this while also taking Aleve (Naprosyn), Motrin/ Advil (Ibuprofen), Toradol (ketoralac). They must stop taking it if they develops stomach pain, increased bleeding or bruising and they should follow-up with their primary care doctor for routine blood work including kidney function to monitor its effect. While it Is not a habit-forming substance, it should only  be taken as needed and to discontinue use once symptoms have resolved. She cannot take this medication   We discussed that when nonoperative treatment is no longer successful and their pain is severe enough that it is affecting their ability to perform activities of daily living, a joint replacement may help them.   My cumulative time spent on this patients visit included: Preparation for the visit, review of the medical records, review of pertinent diagnostic studies, examination and counseling of the patient on the above diagnosis, treatment plan and prognosis, orders of diagnostic tests, medications and/or appropriate procedures and documentation in the medical records of todays visit.

## 2025-04-24 NOTE — HISTORY OF PRESENT ILLNESS
[de-identified] : EDDI GAMING  is an 65 year-old female presents today with a chief complaint of [] hip pain. Patient describes onset over the last number of [], but it may be going on for longer.  Patient points to the hip, specifically in the [] aspect as maximum point of tenderness. Pain is typically []/10 in severity, dull and achy but sometimes sharp in quality with certain activities. Symptoms are exacerbated by activity, or even walking/standing. They are improved with rest and modifications of daily routines. Patient denies any radiation of symptoms beyond the surrounding area. Back and knee symptoms appear to be largely unrelated.   To address the pathology, they have tried OTC medications/NSAIDs with some relief, even though short lasting.  Exercise therapy actually makes things worse but may have allowed greater than 50 % range of motion. Putting on shoes and other similar activities of daily living are difficult. Things have gotten bad enough that patient will need assistive devices like the banister for going up and down stairs. They may need a cane. []  At this point the patient is quite frustrated by their condition. As duration of symptoms exceeds [], they are here for further evaluation and discussion of possible diagnoses and management. They deny any further trauma. No fever or chills, no signs of infection. Today, patient does not state any other associated signs or complains outside of those described.   A complete review of symptoms as well as past medical/surgical history, medications, allergies, social and family history, and other details of HPI and exam were reviewed per first visit intake form and updated accordingly. Additional and more relevant details are noted in further detail today.

## 2025-04-24 NOTE — PHYSICAL EXAM
[de-identified] : General Appearance / Station: Well developed, well nourished, in no acute distress Orientation: Oriented to person, place, and time Gait & Station: Ambulates with assistive device Neurologic: Normal leg sensation Cardiovascular: Warm extremity Lymphatics: No lymphedema Generalized Ligament Laxity: Normal Stiffness: Normal.   LUMBAR SPINE Nontender at lumbar spine Straight leg raise: Negative Motor: 5/5 motor L2-S1 Sensation Intact. No paresthesias L2-S1   SYMPTOMATIC LEFT HIP Range of motion: PAINFUL internal and external rotation of the hip. Strength: Within Normal Limits. Negative Trendelenburg sign                                                                     FADIR: POSITIVE Stinchfield: POSITIVE, with groin pain Palpation: TENDER at greater trochanter, Nontender SI joint                    LEFT KNEE Alignment: Varus Skin: Normal. Effusion: None Quadriceps: Normal Range of motion: Normal PF crepitus: 1+ PF apprehension: None Patella / Patella Tendon: None Palpation: Tender medial joint line Meniscus signs: Medial joint line   SYMPTOMATIC RIGHT HIP Range of motion: PAINFUL internal and external rotation of the hip. Strength: Within Normal Limits. Negative Trendelenburg sign                                                                     FADIR: POSITIVE Stinchfield: POSITIVE, with groin pain Palpation: TENDER at greater trochanter, Nontender SI joint        RIGHT KNEE Alignment: Varus Skin: Normal. Effusion: None Quadriceps: Normal Range of motion: Normal PF crepitus: 1+ PF apprehension: None Patella / Patella Tendon: None Palpation: Medial joint line pain    [de-identified] : Imaging: AP Pelvis and lateral views of the left hip show left hip severe osteoarthritis with loss of joint space, subchondral sclerosis, marginal osteophyte formations, and subchondral cyst. There are no signs of fracture. The soft tissues appear unremarkable  Imaging: Standing 4 views of the right knee show right knee severe arthritis worse in the medial compartment with loss of joint space, subchondral sclerosis, marginal osteophyte formations, and subchondral cyst formation. There are no signs of fracture. There is no  knee effusion. The soft tissues appear unremarkable.  Imaging: Standing 4 views of the left knee show left knee severe arthritis worse in the medial compartment with loss of joint space, subchondral sclerosis, marginal osteophyte formations, and subchondral cyst formation. There are no signs of fracture. There is no  knee effusion. The soft tissues appear unremarkable.

## 2025-05-30 NOTE — PHYSICAL EXAM
[de-identified] : General Appearance / Station: Well developed, well nourished, in no acute distress Orientation: Oriented to person, place, and time Gait & Station: Ambulates with assistive device Neurologic: Normal leg sensation Cardiovascular: Warm extremity Lymphatics: No lymphedema Generalized Ligament Laxity: Normal Stiffness: Normal.   LUMBAR SPINE Nontender at lumbar spine Straight leg raise: Negative Motor: 5/5 motor L2-S1 Sensation Intact. No paresthesias L2-S1   SYMPTOMATIC LEFT HIP Range of motion: PAINFUL internal and external rotation of the hip. Strength: Within Normal Limits. Negative Trendelenburg sign                                                                     FADIR: POSITIVE Stinchfield: POSITIVE, with groin pain Palpation: TENDER at greater trochanter, Nontender SI joint                    LEFT KNEE Alignment: Varus Skin: Normal. Effusion: None Quadriceps: Normal Range of motion: Normal PF crepitus: 1+ PF apprehension: None Patella / Patella Tendon: None Palpation: Tender medial joint line Meniscus signs: Medial joint line   SYMPTOMATIC RIGHT HIP Range of motion: PAINFUL internal and external rotation of the hip. Strength: Within Normal Limits. Negative Trendelenburg sign                                                                     FADIR: POSITIVE Stinchfield: POSITIVE, with groin pain Palpation: TENDER at greater trochanter, Nontender SI joint        RIGHT KNEE Alignment: Varus Skin: Normal. Effusion: None Quadriceps: Normal Range of motion: Normal PF crepitus: 1+ PF apprehension: None Patella / Patella Tendon: None Palpation: Medial joint line pain

## 2025-05-30 NOTE — HISTORY OF PRESENT ILLNESS
[de-identified] : EDDI GAMING  is an 65 year-old female presents today for follw-up with a chief complaint of bilateral left>right hip pain. Patient describes onset over the last number of years, but it may be going on for longer. She has been working with a nutritionist and obesity medicine specialist on weight loss leading up to surgery.  Patient points to the hip, specifically in the groin and anterior thigh aspect as maximum point of tenderness. Pain is typically 6/10 in severity, dull and achy but sometimes sharp in quality with certain activities. Symptoms are exacerbated by activity, or even walking/standing. They are improved with rest and modifications of daily routines. Patient denies any radiation of symptoms beyond the surrounding area. Back and knee symptoms appear to be largely unrelated.   To address the pathology, they have tried OTC medications/NSAIDs with some relief, even though short lasting.  Exercise therapy actually makes things worse but may have allowed greater than 50 % range of motion. Putting on shoes and other similar activities of daily living are difficult. Things have gotten bad enough that patient will need assistive devices like the banister for going up and down stairs. They are using a walker  At this point the patient is quite frustrated by their condition. As duration of symptoms exceeds years, they are here for further evaluation and discussion of possible diagnoses and management. They deny any further trauma. No fever or chills, no signs of infection. Today, patient does not state any other associated signs or complains outside of those described.   A complete review of symptoms as well as past medical/surgical history, medications, allergies, social and family history, and other details of HPI and exam were reviewed per first visit intake form and updated accordingly. Additional and more relevant details are noted in further detail today.

## 2025-05-30 NOTE — DISCUSSION/SUMMARY
[de-identified] : I discussed nonoperative treatment options for their severe bilateral hip arthritis, bilateral knee arthritis. We discussed nonoperative treatments options including activity modification, therapy, bracing, nonsteroidal anti-inflammatory medications, and injections. The patient would like to continue with nonoperative treatment and would like to proceed with activity modification. I would like to see her back in one month  However, I discussed that their morbid obesity put them at a significant increased risk of infection, wound healing issue, early implant failure. I discussed that morbid obesity is a modifiable risk factor and that they must lose weight to decrease their risk of complications after total joint arthroplasty. I would like them to get to a BMI under 45. Target weight is 265   I discussed with them that I often prescribe an anti-inflammatory that should be taken once a day with meals to decrease pain and expedite symptom relief. They should not take this while also taking Aleve (Naprosyn), Motrin/ Advil (Ibuprofen), Toradol (ketoralac). They must stop taking it if they develops stomach pain, increased bleeding or bruising and they should follow-up with their primary care doctor for routine blood work including kidney function to monitor its effect. While it Is not a habit-forming substance, it should only  be taken as needed and to discontinue use once symptoms have resolved. She cannot take this medication   We discussed that when nonoperative treatment is no longer successful and their pain is severe enough that it is affecting their ability to perform activities of daily living, a joint replacement may help them.   My cumulative time spent on this patients visit included: Preparation for the visit, review of the medical records, review of pertinent diagnostic studies, examination and counseling of the patient on the above diagnosis, treatment plan and prognosis, orders of diagnostic tests, medications and/or appropriate procedures and documentation in the medical records of todays visit.

## 2025-05-30 NOTE — PHYSICAL EXAM
[de-identified] : General Appearance / Station: Well developed, well nourished, in no acute distress Orientation: Oriented to person, place, and time Gait & Station: Ambulates with assistive device Neurologic: Normal leg sensation Cardiovascular: Warm extremity Lymphatics: No lymphedema Generalized Ligament Laxity: Normal Stiffness: Normal.   LUMBAR SPINE Nontender at lumbar spine Straight leg raise: Negative Motor: 5/5 motor L2-S1 Sensation Intact. No paresthesias L2-S1   SYMPTOMATIC LEFT HIP Range of motion: PAINFUL internal and external rotation of the hip. Strength: Within Normal Limits. Negative Trendelenburg sign                                                                     FADIR: POSITIVE Stinchfield: POSITIVE, with groin pain Palpation: TENDER at greater trochanter, Nontender SI joint                    LEFT KNEE Alignment: Varus Skin: Normal. Effusion: None Quadriceps: Normal Range of motion: Normal PF crepitus: 1+ PF apprehension: None Patella / Patella Tendon: None Palpation: Tender medial joint line Meniscus signs: Medial joint line   SYMPTOMATIC RIGHT HIP Range of motion: PAINFUL internal and external rotation of the hip. Strength: Within Normal Limits. Negative Trendelenburg sign                                                                     FADIR: POSITIVE Stinchfield: POSITIVE, with groin pain Palpation: TENDER at greater trochanter, Nontender SI joint        RIGHT KNEE Alignment: Varus Skin: Normal. Effusion: None Quadriceps: Normal Range of motion: Normal PF crepitus: 1+ PF apprehension: None Patella / Patella Tendon: None Palpation: Medial joint line pain

## 2025-05-30 NOTE — HISTORY OF PRESENT ILLNESS
[de-identified] : EDDI GAMING  is an 65 year-old female presents today for follw-up with a chief complaint of bilateral left>right hip pain. Patient describes onset over the last number of years, but it may be going on for longer. She has been working with a nutritionist and obesity medicine specialist on weight loss leading up to surgery.  Patient points to the hip, specifically in the groin and anterior thigh aspect as maximum point of tenderness. Pain is typically 6/10 in severity, dull and achy but sometimes sharp in quality with certain activities. Symptoms are exacerbated by activity, or even walking/standing. They are improved with rest and modifications of daily routines. Patient denies any radiation of symptoms beyond the surrounding area. Back and knee symptoms appear to be largely unrelated.   To address the pathology, they have tried OTC medications/NSAIDs with some relief, even though short lasting.  Exercise therapy actually makes things worse but may have allowed greater than 50 % range of motion. Putting on shoes and other similar activities of daily living are difficult. Things have gotten bad enough that patient will need assistive devices like the banister for going up and down stairs. They are using a walker  At this point the patient is quite frustrated by their condition. As duration of symptoms exceeds years, they are here for further evaluation and discussion of possible diagnoses and management. They deny any further trauma. No fever or chills, no signs of infection. Today, patient does not state any other associated signs or complains outside of those described.   A complete review of symptoms as well as past medical/surgical history, medications, allergies, social and family history, and other details of HPI and exam were reviewed per first visit intake form and updated accordingly. Additional and more relevant details are noted in further detail today.

## 2025-05-30 NOTE — DISCUSSION/SUMMARY
[de-identified] : I discussed nonoperative treatment options for their severe bilateral hip arthritis, bilateral knee arthritis. We discussed nonoperative treatments options including activity modification, therapy, bracing, nonsteroidal anti-inflammatory medications, and injections. The patient would like to continue with nonoperative treatment and would like to proceed with activity modification. I would like to see her back in one month  However, I discussed that their morbid obesity put them at a significant increased risk of infection, wound healing issue, early implant failure. I discussed that morbid obesity is a modifiable risk factor and that they must lose weight to decrease their risk of complications after total joint arthroplasty. I would like them to get to a BMI under 45. Target weight is 265   I discussed with them that I often prescribe an anti-inflammatory that should be taken once a day with meals to decrease pain and expedite symptom relief. They should not take this while also taking Aleve (Naprosyn), Motrin/ Advil (Ibuprofen), Toradol (ketoralac). They must stop taking it if they develops stomach pain, increased bleeding or bruising and they should follow-up with their primary care doctor for routine blood work including kidney function to monitor its effect. While it Is not a habit-forming substance, it should only  be taken as needed and to discontinue use once symptoms have resolved. She cannot take this medication   We discussed that when nonoperative treatment is no longer successful and their pain is severe enough that it is affecting their ability to perform activities of daily living, a joint replacement may help them.   My cumulative time spent on this patients visit included: Preparation for the visit, review of the medical records, review of pertinent diagnostic studies, examination and counseling of the patient on the above diagnosis, treatment plan and prognosis, orders of diagnostic tests, medications and/or appropriate procedures and documentation in the medical records of todays visit.

## 2025-06-04 NOTE — PHYSICAL EXAM
[2+] : right 2+ [Ankle Swelling (On Exam)] : present [Ankle Swelling Bilaterally] : bilaterally  [Varicose Veins Of Lower Extremities] : not present [] : bilaterally [Ankle Swelling On The Right] : mild [Calm] : calm [de-identified] : Well-appearing  [de-identified] : EOMI, anicteric  [FreeTextEntry1] : left DP not palpable due to signifiacnt swelling Bilateral Stemmer's sign  [de-identified] : soft, nt, nd  [de-identified] : moves all extremities  [de-identified] : no wounds on bilateral feet [de-identified] : A&Ox4

## 2025-06-04 NOTE — HISTORY OF PRESENT ILLNESS
[FreeTextEntry1] : EDDI GAMING is a 66 year old female who presents for evaluation of preoperative evaluation.   Referred by Dr. aVrela.   Patient states that she has arthritis and is being evaluated for left hip replacement and possible knee replacements in the future.  She has a history of lymphedema. She underwent lymphedema therapy at that time. She does not currently use lymphedema pumps and she uses compression stockings as needed for swelling.  The patient felt that the swelling is under control.  She was referred back to lymphedema rehab last year by orthopedic surgery but due to schedule conflict, did not continue this.  She reports that she has lost a significant amount of weight after using weight loss medications and a gastric sleeve . She has lost 140-150 pounds. In January, she had a panniculectomy.   + PMH: HLD, arthritis, anxiety, constipation, breast cancer s/p mastectomy + PSH: as above, , left knee meniscus repair + FH: HLD, HTN, heart disease, depression + SH: former smoker (quit age 24), h/o of alcoholism (quit 5 years ago), no illicit substance use  + Aller: NKDA  PCP is Dr. Richar Kapoor.

## 2025-06-04 NOTE — ASSESSMENT
[FreeTextEntry1] : EDDI GAMING is a 66 year old female presents for evaluation.   > Lymphedema - Does not currently want lymphedema therapy due to time commitment.  - For symptom management, recommend use of compression stockings (30-40 mmhg, prescription given), leg elevation, ambulation, and continued weight loss.  - Will obtain GOLDIE/PVRs to assess for arterial insufficiency as patient is being evaluated for hip replacement surgery.

## 2025-06-18 NOTE — ASSESSMENT
[FreeTextEntry1] : BARIATRIC SURGERY HISTORY: Laparoscopic Gastric Sleeve Gastrectomy with hiatal hernia repair , Date of Surgery: 9/26/2022  OBESITY CO-MORBIDITIES: DM, MANDY  DM resolved- now pre-dm  ANTI-OBESITY MEDICATIONS: semaglutide, topiramate   OBESITY MEDICATION SIDE EFFECTS: none    Plan: focus on whole food, lean animal protein, fiber, unrefined carbs continue hydrating with water  3 distinguished meals a day continue with topiramate 100mg  see if zepbound is covered under MANDY - if not, will try mounjaro resume physical activity as tolerated continue cpap nightly following up with new Ortho 7/7 f/u with nutrition  f/u 4 weeks

## 2025-06-18 NOTE — HISTORY OF PRESENT ILLNESS
[FreeTextEntry1] : This is a 64 year old female with DM, MANDY with hx of Laparoscopic Gastric Sleeve Gastrectomy seen for obesity followup visit.   current weight: 273 lbs (down 7 lbs) overall weight loss: 137 lbs  underwent panniculectomy 1/30 needs hip replacement surgery- ortho needs better access to hip - advised pt needs to be 265 lbs for BMI of 45  currently on ozempic 2mg and topiramate 100mg - tolerating well without side effects does not feel med is not effective as it once was feels appetite is under control because she feels she has better self control - not because of the medications   wants to try zepbound  is on zepbound  3 meals per day eating 2680-0234 calories per day  swimming 1.5 hours 2 days per week  will do strength training with weights in the pool  doing exercises given to her by physical therapist   sleeping well with CPAP   Hx: Patient had CT of abdomen in Dec to rule out hernia- noted gallstones- asymptomatic - Advised by GI to continue ozempic - will see him in the future to discuss removing gallbladder

## 2025-07-02 NOTE — HISTORY OF PRESENT ILLNESS
[FreeTextEntry1] : EDDI GAMING is a 66 year old female who presents for evaluation of preoperative evaluation.   Referred by Dr. Varela.   Patient states that she has arthritis and is being evaluated for left hip replacement and possible knee replacements in the future.  She has a history of lymphedema. She underwent lymphedema therapy at that time. She does not currently use lymphedema pumps and she uses compression stockings as needed for swelling.  The patient felt that the swelling is under control.  She was referred back to lymphedema rehab last year by orthopedic surgery but due to schedule conflict, did not continue this.  She reports that she has lost a significant amount of weight after using weight loss medications and a gastric sleeve . She has lost 140-150 pounds. In January, she had a panniculectomy.   + PMH: HLD, arthritis, anxiety, constipation, breast cancer s/p mastectomy + PSH: as above, , left knee meniscus repair + FH: HLD, HTN, heart disease, depression + SH: former smoker (quit age 24), h/o of alcoholism (quit 5 years ago), no illicit substance use  + Aller: NKDA  PCP is Dr. Richar Kapoor.  [de-identified] : 6/19/2025 - Spoke w/ patient regarding recent jonnathan/pvrs. Limited study due to noncompressible vessels. Left TBI suggestive for small vessel disease. Given limited study, will obtain bilateral arterial duplex for further evaluation.  7/2/2025 - Presents for follow up for arterial duplex. Now is considering lymphedema therapy.

## 2025-07-02 NOTE — DATA REVIEWED
[FreeTextEntry1] : 7/2/2025 - BL arterial duplex Right - patent arterial system, peroneal artery not visualized Left - patent arterial system

## 2025-07-02 NOTE — ASSESSMENT
[FreeTextEntry1] : EDDI GAMING is a 66 year old female presents for evaluation.   > Lymphedema - For symptom management, recommend use of compression stockings (30-40 mmhg, prescription given), leg elevation, ambulation, and continued weight loss.  - BL arterial duplex with patent arterial system. No further vascular workup or intervention needed. May proceed with planned hip replacement surgery.  - Patient is interested in lymphedema therapy. Will place referral with [facility].

## 2025-07-02 NOTE — PHYSICAL EXAM
[2+] : right 2+ [Ankle Swelling (On Exam)] : present [Ankle Swelling Bilaterally] : bilaterally  [] : bilaterally [Ankle Swelling On The Right] : mild [Calm] : calm [Varicose Veins Of Lower Extremities] : not present [de-identified] : Well-appearing  [de-identified] : EOMI, anicteric  [FreeTextEntry1] : left DP not palpable due to signifiacnt swelling Bilateral Stemmer's sign  [de-identified] : soft, nt, nd  [de-identified] : moves all extremities  [de-identified] : no wounds on bilateral feet [de-identified] : A&Ox4